# Patient Record
Sex: FEMALE | Race: WHITE
[De-identification: names, ages, dates, MRNs, and addresses within clinical notes are randomized per-mention and may not be internally consistent; named-entity substitution may affect disease eponyms.]

---

## 2020-07-24 ENCOUNTER — HOSPITAL ENCOUNTER (OUTPATIENT)
Dept: HOSPITAL 19 - COL.VAS | Age: 55
End: 2020-07-24
Attending: FAMILY MEDICINE
Payer: COMMERCIAL

## 2020-07-24 DIAGNOSIS — I08.0: ICD-10-CM

## 2020-07-24 DIAGNOSIS — I10: Primary | ICD-10-CM

## 2020-12-17 ENCOUNTER — HOSPITAL ENCOUNTER (OUTPATIENT)
Dept: HOSPITAL 19 - SDCO | Age: 55
Discharge: HOME | End: 2020-12-17
Attending: FAMILY MEDICINE
Payer: COMMERCIAL

## 2020-12-17 VITALS — SYSTOLIC BLOOD PRESSURE: 126 MMHG | HEART RATE: 56 BPM | DIASTOLIC BLOOD PRESSURE: 75 MMHG

## 2020-12-17 VITALS — HEART RATE: 70 BPM | DIASTOLIC BLOOD PRESSURE: 83 MMHG | SYSTOLIC BLOOD PRESSURE: 122 MMHG | TEMPERATURE: 98.8 F

## 2020-12-17 VITALS — SYSTOLIC BLOOD PRESSURE: 115 MMHG | TEMPERATURE: 97.5 F | HEART RATE: 60 BPM | DIASTOLIC BLOOD PRESSURE: 66 MMHG

## 2020-12-17 VITALS — SYSTOLIC BLOOD PRESSURE: 118 MMHG | HEART RATE: 54 BPM | DIASTOLIC BLOOD PRESSURE: 78 MMHG

## 2020-12-17 VITALS — HEART RATE: 50 BPM | SYSTOLIC BLOOD PRESSURE: 110 MMHG | DIASTOLIC BLOOD PRESSURE: 72 MMHG

## 2020-12-17 VITALS — WEIGHT: 180.34 LBS | BODY MASS INDEX: 30.05 KG/M2 | HEIGHT: 65 IN

## 2020-12-17 DIAGNOSIS — I10: ICD-10-CM

## 2020-12-17 DIAGNOSIS — D12.3: ICD-10-CM

## 2020-12-17 DIAGNOSIS — L30.9: ICD-10-CM

## 2020-12-17 DIAGNOSIS — Z88.6: ICD-10-CM

## 2020-12-17 DIAGNOSIS — Z91.040: ICD-10-CM

## 2020-12-17 DIAGNOSIS — Z20.828: ICD-10-CM

## 2020-12-17 DIAGNOSIS — J45.909: ICD-10-CM

## 2020-12-17 DIAGNOSIS — Z12.11: Primary | ICD-10-CM

## 2020-12-17 DIAGNOSIS — Z88.8: ICD-10-CM

## 2020-12-17 DIAGNOSIS — Z79.899: ICD-10-CM

## 2020-12-17 NOTE — NUR
PT TOLERATING FOOD AND FLUIDS. DENIES PAIN, DISCOMFORT OR NAUSEA AT THIS TIME.
#20 DC'D TO RIGHT HAND WITHOUT DIFFICULTY. PT TOLERATED WELL. DISMISSAL
INSTRUCTIONS GIVEN, PT VOICES UNDERSTANDING.

## 2020-12-17 NOTE — NUR
PT RETURNED FROM ENDO PROCEDURE ROOM. AMBULATED X2 ASSIST INTO BAY#2 FROM
CART. PT ALERT AND SLEEPY. DENIES PAIN AT THIS TIME, STATES 'FEELING A LITTLE
NAUSEATED.' REQUESTS WARM TEA AND SALTINE CRACKERS. LUNGS CLEAR, HRR, BOWEL
SOUNDS PRESENT. WILL CONT TO MONITOR PROGRESS.